# Patient Record
Sex: FEMALE | Race: WHITE
[De-identification: names, ages, dates, MRNs, and addresses within clinical notes are randomized per-mention and may not be internally consistent; named-entity substitution may affect disease eponyms.]

---

## 2018-04-04 ENCOUNTER — HOSPITAL ENCOUNTER (EMERGENCY)
Dept: HOSPITAL 17 - NED | Age: 23
LOS: 1 days | Discharge: LEFT BEFORE DISCHARGE | End: 2018-04-05
Payer: SELF-PAY

## 2018-04-04 VITALS
OXYGEN SATURATION: 100 % | DIASTOLIC BLOOD PRESSURE: 72 MMHG | HEART RATE: 87 BPM | TEMPERATURE: 98.6 F | SYSTOLIC BLOOD PRESSURE: 110 MMHG | RESPIRATION RATE: 16 BRPM

## 2018-04-04 VITALS — HEIGHT: 71 IN | WEIGHT: 122.36 LBS | BODY MASS INDEX: 17.13 KG/M2

## 2018-04-04 DIAGNOSIS — R07.9: Primary | ICD-10-CM

## 2018-04-04 PROCEDURE — 99281 EMR DPT VST MAYX REQ PHY/QHP: CPT

## 2018-04-04 PROCEDURE — 93005 ELECTROCARDIOGRAM TRACING: CPT

## 2018-04-05 NOTE — EKG
Date Performed: 04/04/2018       Time Performed: 23:16:28

 

PTAGE:      22 years

 

EKG:      Sinus rhythm 

 

 WITH SHORT AL INTERVAL BORDERLINE ECG 

 

NO PREVIOUS TRACING            

 

DOCTOR:   Lolita Ring  Interpretating Date/Time  04/05/2018 13:40:23